# Patient Record
Sex: FEMALE | Race: WHITE | NOT HISPANIC OR LATINO | URBAN - NONMETROPOLITAN AREA
[De-identification: names, ages, dates, MRNs, and addresses within clinical notes are randomized per-mention and may not be internally consistent; named-entity substitution may affect disease eponyms.]

---

## 2018-08-21 PROBLEM — H16.141: Noted: 2020-10-22

## 2018-08-21 PROBLEM — H25.813: Noted: 2018-08-21

## 2018-08-21 PROBLEM — H52.4: Noted: 2018-08-21

## 2018-08-21 PROBLEM — H52.223: Noted: 2018-08-21

## 2018-08-21 PROBLEM — H52.13: Noted: 2018-08-21

## 2018-08-21 PROBLEM — G51.0: Noted: 2020-10-22

## 2019-10-08 ENCOUNTER — IMPORTED ENCOUNTER (OUTPATIENT)
Dept: URBAN - NONMETROPOLITAN AREA CLINIC 1 | Facility: CLINIC | Age: 64
End: 2019-10-08

## 2019-10-08 PROCEDURE — 92014 COMPRE OPH EXAM EST PT 1/>: CPT

## 2019-10-08 PROCEDURE — 92015 DETERMINE REFRACTIVE STATE: CPT

## 2019-10-08 NOTE — PATIENT DISCUSSION
Cataract(s)-Visually significant.-Cataract(s) causing symptomatic impairment of visual function not correctable with a tolerable change in glasses or contact lenses lighting or non-operative means resulting in specific activity limitations and/or participation restrictions including but not limited to reading viewing television driving or meeting vocational or recreational needs. -Expectation is clearer vision and reduced glare disability after cataract removal.-Refer to Dr Tez Huffman for cataract evaluation. 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

## 2020-10-22 ENCOUNTER — IMPORTED ENCOUNTER (OUTPATIENT)
Dept: URBAN - NONMETROPOLITAN AREA CLINIC 1 | Facility: CLINIC | Age: 65
End: 2020-10-22

## 2020-10-22 PROCEDURE — 92014 COMPRE OPH EXAM EST PT 1/>: CPT

## 2020-10-22 PROCEDURE — 92015 DETERMINE REFRACTIVE STATE: CPT

## 2020-10-22 NOTE — PATIENT DISCUSSION
Myopia/astigmatism/presbyopia Discussed diagnosis with patient. Explained that people who are myopic are at a higher risk for developing RD/RT and reviewed associated S&S. Pt to contact our office if symptoms develop. Updated spec Rx given. Recommend lens that will provide comfort as well as protect safety and health of eyes. Cataract(s)Visually significant. Cataract(s) causing symptomatic impairment of visual function not correctable with a tolerable change in glasses or contact lenses lighting or non-operative means resulting in specific activity limitations and/or participation restrictions including but not limited to reading viewing television driving or meeting vocational or recreational needs. Expectation is clearer vision and reduced glare disability after cataract removal.Refer to Dr Jay Velasquez for cataract evaluation. Punctate Keratitis OD due to [place] Palsy right side incomplete blinkContinue Refresh optive gel drops daytime and genteal gel qhs or refresh pm ointmentcontinue Doxycycline 50mg 1 tab PO qd; Nigels Notes: Linda Taylor NP

## 2021-10-26 ENCOUNTER — IMPORTED ENCOUNTER (OUTPATIENT)
Dept: URBAN - NONMETROPOLITAN AREA CLINIC 1 | Facility: CLINIC | Age: 66
End: 2021-10-26

## 2021-10-26 PROCEDURE — 92015 DETERMINE REFRACTIVE STATE: CPT

## 2021-10-26 PROCEDURE — 92014 COMPRE OPH EXAM EST PT 1/>: CPT

## 2021-10-26 NOTE — PATIENT DISCUSSION
Myopia/astigmatism/presbyopia Discussed diagnosis with patient. Updated spec Rx given. Recommend lens that will provide comfort as well as protect safety and health of eyes. Cataract(s)Visually significant. Cataract(s) causing symptomatic impairment of visual function not correctable with a tolerable change in glasses or contact lenses lighting or non-operative means resulting in specific activity limitations and/or participation restrictions including but not limited to reading viewing television driving or meeting vocational or recreational needs. Expectation is clearer vision and reduced glare disability after cataract removal.Refer to Dr Lezlie Siemens for cataract evaluation pt defers eval at this timePunctate Keratitis OD due to [place] Palsy right side incomplete blinkContinue  genteal gel qhscontinue Doxycycline 50mg 1 tab PO qdHas tried multiple OTC tears but sampled THERATEARS EXTRA TID OD; 's Notes: Cl Templeton NP

## 2022-04-09 ASSESSMENT — TONOMETRY
OD_IOP_MMHG: 18
OS_IOP_MMHG: 18
OS_IOP_MMHG: 15
OD_IOP_MMHG: 17
OD_IOP_MMHG: 19
OS_IOP_MMHG: 15

## 2022-04-09 ASSESSMENT — VISUAL ACUITY
OS_CC: 20/20
OU_CC: 20/20
OD_SC: 20/20
OS_SC: 20/20
OD_CC: 20/80
OS_SC: 20/20
OD_SC: 20/30

## 2022-12-20 ENCOUNTER — ESTABLISHED PATIENT (OUTPATIENT)
Dept: RURAL CLINIC 2 | Facility: CLINIC | Age: 67
End: 2022-12-20

## 2022-12-20 PROCEDURE — 92014 COMPRE OPH EXAM EST PT 1/>: CPT

## 2022-12-20 PROCEDURE — 92015 DETERMINE REFRACTIVE STATE: CPT

## 2022-12-20 ASSESSMENT — VISUAL ACUITY
OD_CC: 20/20
OU_CC: J1
OS_CC: 20/20

## 2022-12-20 ASSESSMENT — TONOMETRY
OD_IOP_MMHG: 16
OS_IOP_MMHG: 16

## 2022-12-20 NOTE — PATIENT DISCUSSION
AREDS II not needed at this time. Discussed healthy diet, no smoking and monocular AMSLER grid or equivalent discussed. Call with any changes.